# Patient Record
Sex: FEMALE | Race: AMERICAN INDIAN OR ALASKA NATIVE
[De-identification: names, ages, dates, MRNs, and addresses within clinical notes are randomized per-mention and may not be internally consistent; named-entity substitution may affect disease eponyms.]

---

## 2020-06-28 ENCOUNTER — HOSPITAL ENCOUNTER (EMERGENCY)
Dept: HOSPITAL 5 - ED | Age: 40
Discharge: HOME | End: 2020-06-28
Payer: SELF-PAY

## 2020-06-28 VITALS — DIASTOLIC BLOOD PRESSURE: 98 MMHG | SYSTOLIC BLOOD PRESSURE: 169 MMHG

## 2020-06-28 DIAGNOSIS — R35.0: Primary | ICD-10-CM

## 2020-06-28 DIAGNOSIS — Z79.899: ICD-10-CM

## 2020-06-28 DIAGNOSIS — I10: ICD-10-CM

## 2020-06-28 DIAGNOSIS — E11.9: ICD-10-CM

## 2020-06-28 LAB
ALBUMIN SERPL-MCNC: 3.9 G/DL (ref 3.9–5)
ALT SERPL-CCNC: 16 UNITS/L (ref 7–56)
BASOPHILS # (AUTO): 0 K/MM3 (ref 0–0.1)
BASOPHILS NFR BLD AUTO: 0.5 % (ref 0–1.8)
BILIRUB UR QL STRIP: (no result)
BLOOD UR QL VISUAL: (no result)
BUN SERPL-MCNC: 9 MG/DL (ref 7–17)
BUN/CREAT SERPL: 15 %
CALCIUM SERPL-MCNC: 9.1 MG/DL (ref 8.4–10.2)
EOSINOPHIL # BLD AUTO: 0.1 K/MM3 (ref 0–0.4)
EOSINOPHIL NFR BLD AUTO: 1.5 % (ref 0–4.3)
HCT VFR BLD CALC: 37.5 % (ref 30.3–42.9)
HEMOLYSIS INDEX: 5
HGB BLD-MCNC: 12.4 GM/DL (ref 10.1–14.3)
LYMPHOCYTES # BLD AUTO: 2.4 K/MM3 (ref 1.2–5.4)
LYMPHOCYTES NFR BLD AUTO: 27.4 % (ref 13.4–35)
MCHC RBC AUTO-ENTMCNC: 33 % (ref 30–34)
MCV RBC AUTO: 87 FL (ref 79–97)
MONOCYTES # (AUTO): 0.6 K/MM3 (ref 0–0.8)
MONOCYTES % (AUTO): 6.7 % (ref 0–7.3)
PH UR STRIP: 6 [PH] (ref 5–7)
PLATELET # BLD: 351 K/MM3 (ref 140–440)
PROT UR STRIP-MCNC: (no result) MG/DL
RBC # BLD AUTO: 4.3 M/MM3 (ref 3.65–5.03)
RBC #/AREA URNS HPF: 1 /HPF (ref 0–6)
UROBILINOGEN UR-MCNC: 2 MG/DL (ref ?–2)
WBC #/AREA URNS HPF: < 1 /HPF (ref 0–6)

## 2020-06-28 PROCEDURE — 85025 COMPLETE CBC W/AUTO DIFF WBC: CPT

## 2020-06-28 PROCEDURE — 81001 URINALYSIS AUTO W/SCOPE: CPT

## 2020-06-28 PROCEDURE — 80053 COMPREHEN METABOLIC PANEL: CPT

## 2020-06-28 PROCEDURE — 36415 COLL VENOUS BLD VENIPUNCTURE: CPT

## 2020-06-28 PROCEDURE — 81025 URINE PREGNANCY TEST: CPT

## 2020-06-28 NOTE — EMERGENCY DEPARTMENT REPORT
Blank Doc





- Documentation


Documentation: 





40-year-old female that presents with pelvic pain and pressure.  





This initial assessment/diagnostic orders/clinical plan/treatment(s) is/are 

subject to change based on patient's health status, clinical progression and re-

assessment by fellow clinical providers in the ED.  Further treatment and workup

at subsequent clinical providers discretion.  Patient/guardians urged not to 

elope from the ED as their condition may be serious if not clinically assessed 

and managed.  Initial orders include:


1- Patient sent to [facility] for further evaluation and treatment


2- labs

## 2020-06-28 NOTE — EMERGENCY DEPARTMENT REPORT
Chief Complaint: Abdominal Pain


Stated Complaint: ABD PAIN


Time Seen by Provider: 06/28/20 11:48





- HPI


History of Present Illness: 





This is a 40-year-old female with a history of hypertension and diabetes 

controlled with medication presents the ED today complaining of urinary symptoms

frequency today.  Patient also stating that she has been out of her metformin 

for some days now.  Patient states that she normally takes 500 mg twice a day.  

Patient denies fever/chills/nausea vomiting/abdominal pain/vaginal bleed or 

discharge





- ROS


Review of Systems: 





As noted in HPI





- Exam


Vital Signs: 


                                   Vital Signs











  06/28/20





  11:01


 


Temperature 98.7 F


 


Pulse Rate 81


 


Respiratory 18





Rate 


 


Blood Pressure 169/98


 


O2 Sat by Pulse 99





Oximetry 











Physical Exam: 





GENERAL: Alert and oriented x3, no apparent distress, Normal Gait, atraumatic.


HEAD: Head is normocephalic and a-traumatic.


ABDOMEN: No organomegaly was noted,Positive bowel sounds, soft, and non-disten

ded.  . Nontender to palpation on all Quadrants, NO CVA tenderness.


BACK: Full range of motion, no spinal tenderness, nontender to palpation.





SKIN:  Warm and dry, No lesions, No ulceration or induration present.








MSE screening note: 


Focused history and physical exam performed.


Due to findings the following was ordered:











ED Medical Decision Making





- Lab Data


Result diagrams: 


                                 06/28/20 11:57





                                 06/28/20 11:57





                             Laboratory Last Values











WBC  8.7 K/mm3 (4.5-11.0)   06/28/20  11:57    


 


RBC  4.30 M/mm3 (3.65-5.03)   06/28/20  11:57    


 


Hgb  12.4 gm/dl (10.1-14.3)   06/28/20  11:57    


 


Hct  37.5 % (30.3-42.9)   06/28/20  11:57    


 


MCV  87 fl (79-97)   06/28/20  11:57    


 


MCH  29 pg (28-32)   06/28/20  11:57    


 


MCHC  33 % (30-34)   06/28/20  11:57    


 


RDW  13.5 % (13.2-15.2)   06/28/20  11:57    


 


Plt Count  351 K/mm3 (140-440)   06/28/20  11:57    


 


Lymph % (Auto)  27.4 % (13.4-35.0)   06/28/20  11:57    


 


Mono % (Auto)  6.7 % (0.0-7.3)   06/28/20  11:57    


 


Eos % (Auto)  1.5 % (0.0-4.3)   06/28/20  11:57    


 


Baso % (Auto)  0.5 % (0.0-1.8)   06/28/20  11:57    


 


Lymph #  2.4 K/mm3 (1.2-5.4)   06/28/20  11:57    


 


Mono #  0.6 K/mm3 (0.0-0.8)   06/28/20  11:57    


 


Eos #  0.1 K/mm3 (0.0-0.4)   06/28/20  11:57    


 


Baso #  0.0 K/mm3 (0.0-0.1)   06/28/20  11:57    


 


Seg Neutrophils %  63.9 % (40.0-70.0)   06/28/20  11:57    


 


Seg Neutrophils #  5.6 K/mm3 (1.8-7.7)   06/28/20  11:57    


 


Sodium  136 mmol/L (137-145)  L  06/28/20  11:57    


 


Potassium  3.4 mmol/L (3.6-5.0)  L  06/28/20  11:57    


 


Chloride  97.8 mmol/L ()  L  06/28/20  11:57    


 


Carbon Dioxide  28 mmol/L (22-30)   06/28/20  11:57    


 


Anion Gap  14 mmol/L  06/28/20  11:57    


 


BUN  9 mg/dL (7-17)   06/28/20  11:57    


 


Creatinine  0.6 mg/dL (0.7-1.2)  L  06/28/20  11:57    


 


Estimated GFR  > 60 ml/min  06/28/20  11:57    


 


BUN/Creatinine Ratio  15 %  06/28/20  11:57    


 


Glucose  285 mg/dL ()  H  06/28/20  11:57    


 


Calcium  9.1 mg/dL (8.4-10.2)   06/28/20  11:57    


 


Total Bilirubin  0.20 mg/dL (0.1-1.2)   06/28/20  11:57    


 


AST  15 units/L (5-40)   06/28/20  11:57    


 


ALT  16 units/L (7-56)   06/28/20  11:57    


 


Alkaline Phosphatase  92 units/L ()   06/28/20  11:57    


 


Total Protein  7.6 g/dL (6.3-8.2)   06/28/20  11:57    


 


Albumin  3.9 g/dL (3.9-5)   06/28/20  11:57    


 


Albumin/Globulin Ratio  1.1 %  06/28/20  11:57    


 


Urine Color  Yellow  (Yellow)   06/28/20  Unknown


 


Urine Turbidity  Clear  (Clear)   06/28/20  Unknown


 


Urine pH  6.0  (5.0-7.0)   06/28/20  Unknown


 


Ur Specific Gravity  1.032  (1.003-1.030)  H  06/28/20  Unknown


 


Urine Protein  <15 mg/dl mg/dL (Negative)   06/28/20  Unknown


 


Urine Glucose (UA)  >=500 mg/dL (Negative)   06/28/20  Unknown


 


Urine Ketones  Tr mg/dL (Negative)   06/28/20  Unknown


 


Urine Blood  Neg  (Negative)   06/28/20  Unknown


 


Urine Nitrite  Neg  (Negative)   06/28/20  Unknown


 


Urine Bilirubin  Neg  (Negative)   06/28/20  Unknown


 


Urine Urobilinogen  2.0 mg/dL (<2.0)   06/28/20  Unknown


 


Ur Leukocyte Esterase  Neg  (Negative)   06/28/20  Unknown


 


Urine WBC (Auto)  < 1.0 /HPF (0.0-6.0)   06/28/20  Unknown


 


Urine RBC (Auto)  1.0 /HPF (0.0-6.0)   06/28/20  Unknown


 


U Epithel Cells (Auto)  < 1.0 /HPF (0-13.0)   06/28/20  Unknown


 


Urine HCG, Qual  Negative  (Negative)   06/28/20  Unknown














- Medical Decision Making





This is a 40-year-old female who presents with urinary symptoms and medication 

refill.


All labs are within normal limits, urinalysis negative, pregnancy test negative.


Vital signs are normal patient is in no acute distress.


I discussed all findings with the patient and discussed with her to follow-up 

with her primary care physician.








ED Disposition for MSE


Clinical Impression: 


 Urinary frequency





Disposition: DC-01 TO HOME OR SELFCARE


Is pt being admited?: No


Does the pt Need Aspirin: No


Condition: Stable


Instructions:  Abdominal Pain (ED)


Additional Instructions: 


Make sure to follow up with the primary care physician as discussed.


Take all your medications as you've been prescribed.


If you have any worsening symptoms or develop new symptoms please return to ED 

immediately.


Prescriptions: 


Losartan [Cozaar] 50 mg PO QDAY #30 tablet


metFORMIN [Glucophage] 500 mg PO BID #60 tablet


hydroCHLOROthiazide [HCTZ] 25 mg PO QDAY #30 tablet


Referrals: 


[facility] [Outside] - 3-5 Days


Forms:  Work/School Release Form(ED)


Time of Disposition: 15:07

## 2020-09-13 ENCOUNTER — HOSPITAL ENCOUNTER (EMERGENCY)
Dept: HOSPITAL 5 - ED | Age: 40
Discharge: LEFT BEFORE DISCHARGE | End: 2020-09-13
Payer: SELF-PAY

## 2020-09-13 DIAGNOSIS — M54.6: Primary | ICD-10-CM

## 2020-09-13 DIAGNOSIS — Z53.21: ICD-10-CM

## 2020-09-13 LAB
BACTERIA #/AREA URNS HPF: (no result) /HPF
BILIRUB UR QL STRIP: (no result)
BLOOD UR QL VISUAL: (no result)
MUCOUS THREADS #/AREA URNS HPF: (no result) /HPF
PH UR STRIP: 6 [PH] (ref 5–7)
PROT UR STRIP-MCNC: (no result) MG/DL
RBC #/AREA URNS HPF: 6 /HPF (ref 0–6)
UROBILINOGEN UR-MCNC: 2 MG/DL (ref ?–2)
WBC #/AREA URNS HPF: 4 /HPF (ref 0–6)

## 2020-09-13 PROCEDURE — 81001 URINALYSIS AUTO W/SCOPE: CPT

## 2020-09-13 PROCEDURE — 81025 URINE PREGNANCY TEST: CPT

## 2020-12-08 ENCOUNTER — HOSPITAL ENCOUNTER (EMERGENCY)
Dept: HOSPITAL 5 - ED | Age: 40
Discharge: HOME | End: 2020-12-08
Payer: SELF-PAY

## 2020-12-08 VITALS — SYSTOLIC BLOOD PRESSURE: 160 MMHG | DIASTOLIC BLOOD PRESSURE: 100 MMHG

## 2020-12-08 DIAGNOSIS — Z98.890: ICD-10-CM

## 2020-12-08 DIAGNOSIS — Z79.899: ICD-10-CM

## 2020-12-08 DIAGNOSIS — I10: Primary | ICD-10-CM

## 2020-12-08 DIAGNOSIS — E11.65: ICD-10-CM

## 2020-12-08 DIAGNOSIS — Z91.14: ICD-10-CM

## 2020-12-08 LAB
ALBUMIN SERPL-MCNC: 4.1 G/DL (ref 3.9–5)
ALT SERPL-CCNC: 12 UNITS/L (ref 7–56)
BACTERIA #/AREA URNS HPF: (no result) /HPF
BASOPHILS # (AUTO): 0.1 K/MM3 (ref 0–0.1)
BASOPHILS NFR BLD AUTO: 0.6 % (ref 0–1.8)
BILIRUB UR QL STRIP: (no result)
BLOOD UR QL VISUAL: (no result)
BUN SERPL-MCNC: 8 MG/DL (ref 7–17)
BUN/CREAT SERPL: 13 %
CALCIUM SERPL-MCNC: 9.4 MG/DL (ref 8.4–10.2)
EOSINOPHIL # BLD AUTO: 0.1 K/MM3 (ref 0–0.4)
EOSINOPHIL NFR BLD AUTO: 1.4 % (ref 0–4.3)
HCT VFR BLD CALC: 39.6 % (ref 30.3–42.9)
HEMOLYSIS INDEX: 14
HGB BLD-MCNC: 13.1 GM/DL (ref 10.1–14.3)
LYMPHOCYTES # BLD AUTO: 2.9 K/MM3 (ref 1.2–5.4)
LYMPHOCYTES NFR BLD AUTO: 28.2 % (ref 13.4–35)
MCHC RBC AUTO-ENTMCNC: 33 % (ref 30–34)
MCV RBC AUTO: 83 FL (ref 79–97)
MONOCYTES # (AUTO): 0.7 K/MM3 (ref 0–0.8)
MONOCYTES % (AUTO): 6.3 % (ref 0–7.3)
MUCOUS THREADS #/AREA URNS HPF: (no result) /HPF
PH UR STRIP: 5 [PH] (ref 5–7)
PLATELET # BLD: 391 K/MM3 (ref 140–440)
RBC # BLD AUTO: 4.79 M/MM3 (ref 3.65–5.03)
RBC #/AREA URNS HPF: 18 /HPF (ref 0–6)
UROBILINOGEN UR-MCNC: < 2 MG/DL (ref ?–2)
WBC #/AREA URNS HPF: 6 /HPF (ref 0–6)

## 2020-12-08 PROCEDURE — 99284 EMERGENCY DEPT VISIT MOD MDM: CPT

## 2020-12-08 PROCEDURE — 96361 HYDRATE IV INFUSION ADD-ON: CPT

## 2020-12-08 PROCEDURE — 36415 COLL VENOUS BLD VENIPUNCTURE: CPT

## 2020-12-08 PROCEDURE — 81025 URINE PREGNANCY TEST: CPT

## 2020-12-08 PROCEDURE — 80053 COMPREHEN METABOLIC PANEL: CPT

## 2020-12-08 PROCEDURE — 96374 THER/PROPH/DIAG INJ IV PUSH: CPT

## 2020-12-08 PROCEDURE — 82805 BLOOD GASES W/O2 SATURATION: CPT

## 2020-12-08 PROCEDURE — 82010 KETONE BODYS QUAN: CPT

## 2020-12-08 PROCEDURE — 85025 COMPLETE CBC W/AUTO DIFF WBC: CPT

## 2020-12-08 PROCEDURE — 82962 GLUCOSE BLOOD TEST: CPT

## 2020-12-08 PROCEDURE — 81001 URINALYSIS AUTO W/SCOPE: CPT

## 2020-12-08 PROCEDURE — 71046 X-RAY EXAM CHEST 2 VIEWS: CPT

## 2020-12-08 NOTE — EMERGENCY DEPARTMENT REPORT
ED General Adult HPI





- General


Chief complaint: Extremity Problem,Nontraumatic


Stated complaint: NUMBNESS LEG AND TOES RT SIDE


PUI?: No


Time Seen by Provider: 12/08/20 12:25


Source: patient


Mode of arrival: Ambulatory


Limitations: No Limitations





- History of Present Illness


Initial comments: 





Patient is a 40-year-old -American female that comes to the emergency 

room initially complaining of tingling of her right foot.  She states that she 

is diabetic.  She adds that she has been out of her diabetic medicines for 3 

weeks and that her sugar has been high.  She does not have a primary care 

physician.  Her blood sugar in triage was 277.





Patient states she has a history of neuropathy but has never taken medications 

for it.  She states her pain is usually worse when her blood sugar is high.





Patient is supposed to be on Cozaar, HCTZ and Metformin.





Patient denies chest pain, shortness of breath, fever or chills, cough, 

abdominal pain, nausea vomiting or diarrhea.  Patient denies any dysuria or back

pain.  Denies vaginal discharge or bleeding.





Patient noted to be hypertensive on arrival to the ER in the setting of being 

off her Cozaar and HCTZ for several weeks.  Patient again has no chest pain 

shortness of breath headache or focal deficit.





Patient is ambulatory nontoxic and non-ill-appearing on arrival to [facility].





Last menstrual period earlier this month.





-: Gradual, days(s)


Consistency: intermittent


Improves with: none


Worsens with: none


Associated Symptoms: denies: confusion, chest pain, cough, diaphoresis, 

fever/chills, headaches, loss of appetite, malaise, nausea/vomiting, rash, 

seizure, shortness of breath, syncope, weakness


Treatments Prior to Arrival: none





- Related Data


                                  Previous Rx's











 Medication  Instructions  Recorded  Last Taken  Type


 


Losartan [Cozaar] 50 mg PO QDAY #30 tablet 12/08/20 Unknown Rx


 


hydroCHLOROthiazide [HCTZ] 25 mg PO QDAY #30 tablet 12/08/20 Unknown Rx


 


metFORMIN [Glucophage] 500 mg PO BID #60 tablet 12/08/20 Unknown Rx











                                    Allergies











Allergy/AdvReac Type Severity Reaction Status Date / Time


 


No Known Allergies Allergy   Unverified 06/28/20 10:58














ED Review of Systems


ROS: 


Stated complaint: NUMBNESS LEG AND TOES RT SIDE


Other details as noted in HPI





Comment: All other systems reviewed and negative





ED Past Medical Hx





- Past Medical History


Previous Medical History?: Yes


Hx Hypertension: Yes


Hx CVA: No


Hx Heart Attack/AMI: No


Hx Congestive Heart Failure: No


Hx Diabetes: Yes


Hx Deep Vein Thrombosis: No


Hx Pulmonary Embolism: No





- Surgical History


Past Surgical History?: Yes


Additional Surgical History: C SECTION X2





- Social History


Smoking Status: Never Smoker


Substance Use Type: None





- Medications


Home Medications: 


                                Home Medications











 Medication  Instructions  Recorded  Confirmed  Last Taken  Type


 


Losartan [Cozaar] 50 mg PO QDAY #30 tablet 12/08/20  Unknown Rx


 


hydroCHLOROthiazide [HCTZ] 25 mg PO QDAY #30 tablet 12/08/20  Unknown Rx


 


metFORMIN [Glucophage] 500 mg PO BID #60 tablet 12/08/20  Unknown Rx














ED Physical Exam





- General


Limitations: No Limitations


General appearance: alert, in no apparent distress





- Head


Head exam: Present: atraumatic, normocephalic





- Eye


Eye exam: Present: normal appearance





- ENT


ENT exam: Present: mucous membranes moist





- Neck


Neck exam: Present: normal inspection





- Respiratory


Respiratory exam: Present: normal lung sounds bilaterally.  Absent: respiratory 

distress





- Cardiovascular


Cardiovascular Exam: Present: regular rate, normal rhythm.  Absent: systolic 

murmur, diastolic murmur, rubs, gallop





- GI/Abdominal


GI/Abdominal exam: Present: soft, normal bowel sounds





- Extremities Exam


Extremities exam: Present: normal inspection





- Back Exam


Back exam: Present: normal inspection





- Neurological Exam


Neurological exam: Present: alert, oriented X3





- Psychiatric


Psychiatric exam: Present: normal affect, normal mood





- Skin


Skin exam: Present: warm, dry, intact, normal color.  Absent: rash





ED Course


                                   Vital Signs











  12/08/20 12/08/20





  09:36 13:34


 


Temperature 98.6 F 


 


Pulse Rate 97 H 86


 


Respiratory 16 





Rate  


 


Blood Pressure  174/106


 


Blood Pressure 161/109 





[Right]  


 


O2 Sat by Pulse 98 





Oximetry  














ED Medical Decision Making





- Lab Data


Result diagrams: 


                                 12/08/20 12:43





                                 12/08/20 12:43





- Radiology Data


Radiology results: report reviewed, image reviewed





No acute process





- Medical Decision Making





Patient noted to be hyperglycemic.  She had negative ketones in her blood.  

Positive ketones in urine.  Patient is not hypotensive and heart rate is 97.








                                   Vital Signs











  12/08/20 12/08/20





  09:36 13:34


 


Temperature 98.6 F 


 


Pulse Rate 97 H 86


 


Respiratory 16 





Rate  


 


Blood Pressure  174/106


 


Blood Pressure 161/109 





[Right]  


 


O2 Sat by Pulse 98 





Oximetry  








Patient medicated with her home Cozaar and HCTZ.  She has no focal deficit and 

no neuro complaint.








                                   Lab Results











  12/08/20 12/08/20 12/08/20 Range/Units





  09:41 12:39 12:43 


 


WBC    10.4  (4.5-11.0)  K/mm3


 


RBC    4.79  (3.65-5.03)  M/mm3


 


Hgb    13.1  (10.1-14.3)  gm/dl


 


Hct    39.6  (30.3-42.9)  %


 


MCV    83  (79-97)  fl


 


MCH    27 L  (28-32)  pg


 


MCHC    33  (30-34)  %


 


RDW    13.9  (13.2-15.2)  %


 


Plt Count    391  (140-440)  K/mm3


 


Lymph % (Auto)    28.2  (13.4-35.0)  %


 


Mono % (Auto)    6.3  (0.0-7.3)  %


 


Eos % (Auto)    1.4  (0.0-4.3)  %


 


Baso % (Auto)    0.6  (0.0-1.8)  %


 


Lymph # (Auto)    2.9  (1.2-5.4)  K/mm3


 


Mono # (Auto)    0.7  (0.0-0.8)  K/mm3


 


Eos # (Auto)    0.1  (0.0-0.4)  K/mm3


 


Baso # (Auto)    0.1  (0.0-0.1)  K/mm3


 


Seg Neutrophils %    63.5  (40.0-70.0)  %


 


Seg Neutrophils #    6.6  (1.8-7.7)  K/mm3


 


VBG pH     (7.320-7.420)  


 


Sodium     (137-145)  mmol/L


 


Potassium     (3.6-5.0)  mmol/L


 


Chloride     ()  mmol/L


 


Carbon Dioxide     (22-30)  mmol/L


 


Anion Gap     mmol/L


 


BUN     (7-17)  mg/dL


 


Creatinine     (0.6-1.2)  mg/dL


 


Estimated GFR     ml/min


 


BUN/Creatinine Ratio     %


 


Glucose     ()  mg/dL


 


POC Glucose  277 H    ()  mg/dL


 


Ketones Quantitative     (Negative)  


 


Calcium     (8.4-10.2)  mg/dL


 


Total Bilirubin     (0.1-1.2)  mg/dL


 


AST     (5-40)  units/L


 


ALT     (7-56)  units/L


 


Alkaline Phosphatase     ()  units/L


 


Total Protein     (6.3-8.2)  g/dL


 


Albumin     (3.9-5)  g/dL


 


Albumin/Globulin Ratio     %


 


Urine Color   Yellow   (Yellow)  


 


Urine Turbidity   Slightly-cloudy   (Clear)  


 


Urine pH   5.0   (5.0-7.0)  


 


Ur Specific Gravity   1.036 H   (1.003-1.030)  


 


Urine Protein   30 mg/dl   (Negative)  mg/dL


 


Urine Glucose (UA)   >=500   (Negative)  mg/dL


 


Urine Ketones   80   (Negative)  mg/dL


 


Urine Blood   Neg   (Negative)  


 


Urine Nitrite   Neg   (Negative)  


 


Ur Reducing Substances   Not Reportable   


 


Urine Bilirubin   Neg   (Negative)  


 


Urine Ictotest   Not Reportable   


 


Urine Urobilinogen   < 2.0   (<2.0)  mg/dL


 


Ur Leukocyte Esterase   Neg   (Negative)  


 


Urine WBC (Auto)   6.0   (0.0-6.0)  /HPF


 


Urine RBC (Auto)   18.0   (0.0-6.0)  /HPF


 


U Epithel Cells (Auto)   6.0   (0-13.0)  /HPF


 


Urine Bacteria (Auto)   1+   (Negative)  /HPF


 


Urine Mucus   Few   /HPF


 


Urine HCG, Qual   Negative   (Negative)  














  12/08/20 12/08/20 12/08/20 Range/Units





  12:43 12:43 13:49 


 


WBC     (4.5-11.0)  K/mm3


 


RBC     (3.65-5.03)  M/mm3


 


Hgb     (10.1-14.3)  gm/dl


 


Hct     (30.3-42.9)  %


 


MCV     (79-97)  fl


 


MCH     (28-32)  pg


 


MCHC     (30-34)  %


 


RDW     (13.2-15.2)  %


 


Plt Count     (140-440)  K/mm3


 


Lymph % (Auto)     (13.4-35.0)  %


 


Mono % (Auto)     (0.0-7.3)  %


 


Eos % (Auto)     (0.0-4.3)  %


 


Baso % (Auto)     (0.0-1.8)  %


 


Lymph # (Auto)     (1.2-5.4)  K/mm3


 


Mono # (Auto)     (0.0-0.8)  K/mm3


 


Eos # (Auto)     (0.0-0.4)  K/mm3


 


Baso # (Auto)     (0.0-0.1)  K/mm3


 


Seg Neutrophils %     (40.0-70.0)  %


 


Seg Neutrophils #     (1.8-7.7)  K/mm3


 


VBG pH   7.340   (7.320-7.420)  


 


Sodium  134 L    (137-145)  mmol/L


 


Potassium  3.8    (3.6-5.0)  mmol/L


 


Chloride  97.3 L    ()  mmol/L


 


Carbon Dioxide  27    (22-30)  mmol/L


 


Anion Gap  14    mmol/L


 


BUN  8    (7-17)  mg/dL


 


Creatinine  0.6    (0.6-1.2)  mg/dL


 


Estimated GFR  > 60    ml/min


 


BUN/Creatinine Ratio  13    %


 


Glucose  283 H    ()  mg/dL


 


POC Glucose    159 H  ()  mg/dL


 


Ketones Quantitative  Negative    (Negative)  


 


Calcium  9.4    (8.4-10.2)  mg/dL


 


Total Bilirubin  0.20    (0.1-1.2)  mg/dL


 


AST  15    (5-40)  units/L


 


ALT  12    (7-56)  units/L


 


Alkaline Phosphatase  107    ()  units/L


 


Total Protein  8.5 H    (6.3-8.2)  g/dL


 


Albumin  4.1    (3.9-5)  g/dL


 


Albumin/Globulin Ratio  0.9    %


 


Urine Color     (Yellow)  


 


Urine Turbidity     (Clear)  


 


Urine pH     (5.0-7.0)  


 


Ur Specific Gravity     (1.003-1.030)  


 


Urine Protein     (Negative)  mg/dL


 


Urine Glucose (UA)     (Negative)  mg/dL


 


Urine Ketones     (Negative)  mg/dL


 


Urine Blood     (Negative)  


 


Urine Nitrite     (Negative)  


 


Ur Reducing Substances     


 


Urine Bilirubin     (Negative)  


 


Urine Ictotest     


 


Urine Urobilinogen     (<2.0)  mg/dL


 


Ur Leukocyte Esterase     (Negative)  


 


Urine WBC (Auto)     (0.0-6.0)  /HPF


 


Urine RBC (Auto)     (0.0-6.0)  /HPF


 


U Epithel Cells (Auto)     (0-13.0)  /HPF


 


Urine Bacteria (Auto)     (Negative)  /HPF


 


Urine Mucus     /HPF


 


Urine HCG, Qual     (Negative)  








Labs noted.  





Patient given insulin 5 units IV with blood sugar then less than 200.





Patient given 2 L of normal saline.





Patient has been educated about the importance of managing her hyper glycemia.  

She is also been educated about the consequences of hypertension.





Patient is ambulatory, nontoxic and non-ill-appearing on reexam at 1400.  Blood 

sugar now less than 200.





Patient being discharged to home with discharge instructions including follow-

up, medication, diet and activity.  She has been given a referral to our primary

care doctor.  I have given her refills of her medications for 1 month.  I told 

her that the ER does not normally do this and that she needs to follow-up with a

primary care.  Have asked her to call soon as possible to make a first available

appointment so that she does not end up in DKA.  Patient verbalizes 

understanding of discharge plan of care.











- Differential Diagnosis


ro dka


Critical care attestation.: 


If time is entered above; I have spent that time in minutes in the direct care 

of this critically ill patient, excluding procedure time.








ED Disposition


Clinical Impression: 


 Hyperglycemia, Nonadherence to medication, Hypertension, Diabetes mellitus





Disposition: DC-01 TO HOME OR SELFCARE


Is pt being admited?: No


Does the pt Need Aspirin: No


Condition: Stable


Instructions:  Preventing Diabetes Mellitus Complications, Diabetes Mellitus 

Type 2 in Adults (ED), Hypertension (ED)


Additional Instructions: 


diabetic diet





stay well hydrated with water





meds as ordered today





call pcp for appointment asap


referral below





activity as tolerated








Prescriptions: 


Losartan [Cozaar] 50 mg PO QDAY #30 tablet


metFORMIN [Glucophage] 500 mg PO BID #60 tablet


hydroCHLOROthiazide [HCTZ] 25 mg PO QDAY #30 tablet


Referrals: 


SIERRA THOMPSON MD [Staff Physician] - 3-5 Days


Time of Disposition: 13:20

## 2020-12-08 NOTE — XRAY REPORT
XR chest routine 2V



INDICATION / CLINICAL INFORMATION:

weak



COMPARISON: 

None available.



FINDINGS:



SUPPORT DEVICES: None.



HEART / MEDIASTINUM: No significant abnormality. 



LUNGS / PLEURA: Lungs are clear.  Costophrenic sulci are sharp. No pneumothorax.



ADDITIONAL FINDINGS: No significant additional findings.



IMPRESSION:

1. No acute findings.



Signer Name: Josh Edwards MD 

Signed: 12/8/2020 1:55 PM

Workstation Name: [id number]